# Patient Record
Sex: FEMALE | Race: OTHER | Employment: UNEMPLOYED | URBAN - METROPOLITAN AREA
[De-identification: names, ages, dates, MRNs, and addresses within clinical notes are randomized per-mention and may not be internally consistent; named-entity substitution may affect disease eponyms.]

---

## 2022-07-07 ENCOUNTER — OFFICE VISIT (OUTPATIENT)
Dept: PRIMARY CARE CLINIC | Age: 22
End: 2022-07-07
Payer: COMMERCIAL

## 2022-07-07 VITALS
HEIGHT: 62 IN | DIASTOLIC BLOOD PRESSURE: 71 MMHG | OXYGEN SATURATION: 99 % | RESPIRATION RATE: 16 BRPM | BODY MASS INDEX: 40.04 KG/M2 | TEMPERATURE: 97.1 F | HEART RATE: 83 BPM | SYSTOLIC BLOOD PRESSURE: 111 MMHG | WEIGHT: 217.6 LBS

## 2022-07-07 DIAGNOSIS — K13.0 LIP LESION: ICD-10-CM

## 2022-07-07 DIAGNOSIS — F33.0 MILD EPISODE OF RECURRENT MAJOR DEPRESSIVE DISORDER (HCC): Primary | ICD-10-CM

## 2022-07-07 PROCEDURE — 99203 OFFICE O/P NEW LOW 30 MIN: CPT | Performed by: FAMILY MEDICINE

## 2022-07-07 RX ORDER — BUPROPION HYDROCHLORIDE 100 MG/1
100 TABLET, EXTENDED RELEASE ORAL DAILY
Qty: 30 TABLET | Refills: 2 | Status: SHIPPED | OUTPATIENT
Start: 2022-07-07 | End: 2022-07-08 | Stop reason: SDUPTHER

## 2022-07-07 NOTE — PROGRESS NOTES
Chief Complaint   Patient presents with    New Patient    Physical     Visit Vitals  /71 (BP 1 Location: Left upper arm, BP Patient Position: Sitting, BP Cuff Size: Adult)   Pulse 83   Temp 97.1 °F (36.2 °C) (Temporal)   Resp 16   Ht 5' 2\" (1.575 m)   Wt 217 lb 9.6 oz (98.7 kg)   SpO2 99%   BMI 39.80 kg/m²     1. \"Have you been to the ER, urgent care clinic since your last visit? Hospitalized since your last visit? \" No    2. \"Have you seen or consulted any other health care providers outside of the [address] [place] [address] since your last visit? \" No     3. For patients aged 39-70: Has the patient had a colonoscopy / FIT/ Cologuard? NA - based on age      If the patient is female:    4. For patients aged 41-77: Has the patient had a mammogram within the past 2 years? NA - based on age or sex      11. For patients aged 21-65: Has the patient had a pap smear?  No

## 2022-07-07 NOTE — PROGRESS NOTES
HPI     Chief Complaint:   Chief Complaint   Patient presents with    New Patient    Physical       Bret Dixon is an 25 y.o. female. No current PCP. Medical history significant for: no prior medical history  There is no problem list on file for this patient. Concerns for today's visit:    Depression: hx of depression since adolescence. Was treated in high school but due to insurance changes she came off of medication (Celexa) for sometime. Sbe has a toddler, and noticed worsening depression symptoms, so she was restarted on Celexa 20mg last fall. She noticed some improvement, but not a drastic change. She also notes depression worsened after her IUD was placed, so she had it removed, but did not notice improvement in her mood. Currently:  Sleep: decreassed  Interest: decreased  Guilt: increased  Energy: decreased  Concentration: decreased  Appetite: increased  Psychomotor: none  Suicide: denies HI/SI    Lip discoloration: growing in size for several months, initially pinpoint, and now about the size of a small pea. No other lesions. Not painful. Allergies - reviewed:   No Known Allergies    Past Medical History - reviewed:  Past Medical History:   Diagnosis Date    Depression     PTSD (post-traumatic stress disorder)        Past Surgical History - reviewed:  History reviewed. No pertinent surgical history. Immunizations - reviewed:   Immunization History   Administered Date(s) Administered    COVID-19, PFIZER PURPLE top, DILUTE for use, (age 15 y+), IM, 30mcg/0.3mL 08/19/2021, 09/20/2021, 01/30/2022       Review of Systems   Review of Systems   Cardiovascular: Negative for chest pain and palpitations. Skin: Negative for itching and rash. Endo/Heme/Allergies: Negative for polydipsia. Does not bruise/bleed easily. Psychiatric/Behavioral: Positive for depression. Negative for substance abuse and suicidal ideas. The patient does not have insomnia.         Reviewed PmHx, FmHx, SocHx as well as meds and allergies, updated and dated in the chart. Objective     Visit Vitals  /71 (BP 1 Location: Left upper arm, BP Patient Position: Sitting, BP Cuff Size: Adult)   Pulse 83   Temp 97.1 °F (36.2 °C) (Temporal)   Resp 16   Ht 5' 2\" (1.575 m)   Wt 217 lb 9.6 oz (98.7 kg)   SpO2 99%   BMI 39.80 kg/m²       Physical Exam  Vitals and nursing note reviewed. Constitutional:       General: She is not in acute distress. Cardiovascular:      Rate and Rhythm: Normal rate and regular rhythm. Pulmonary:      Effort: Pulmonary effort is normal. No respiratory distress. Breath sounds: Normal breath sounds. Skin:     Coloration: Skin is not jaundiced. Comments: Non palpable pigmented lesion to lower left lip with regular borders. Neurological:      General: No focal deficit present. Mental Status: She is alert and oriented to person, place, and time. Assessment and Plan     Diagnoses and all orders for this visit:    1. Mild episode of recurrent major depressive disorder ([facility] [address]  Comments: Will switch to wellbutrin. Not currently on SSRI. Start daily with close follow up and notify me of any concerns. Orders:  -     buPROPion SR (WELLBUTRIN SR) 100 mg SR tablet; Take 1 Tablet by mouth daily. 2. Lip lesion  Comments:  Possibly benign but given concern and also growth referring to dermatology. Orders:  -     REFERRAL TO DERMATOLOGY         Follow-up and Dispositions    Return in about 2 months (around 9/7/2022) for annual physical and depression follow up. I discussed the aforementioned diagnoses with the patient as well as the plan of care. All questions were answered and an AVS was provided.        Suleman Butt MD  [facility]  [address], [place], [address] [place] [address]

## 2022-07-08 ENCOUNTER — TELEPHONE (OUTPATIENT)
Dept: PRIMARY CARE CLINIC | Age: 22
End: 2022-07-08

## 2022-07-08 DIAGNOSIS — F33.0 MILD EPISODE OF RECURRENT MAJOR DEPRESSIVE DISORDER (HCC): ICD-10-CM

## 2022-07-08 RX ORDER — BUPROPION HYDROCHLORIDE 100 MG/1
100 TABLET, EXTENDED RELEASE ORAL DAILY
Qty: 30 TABLET | Refills: 2 | Status: SHIPPED | OUTPATIENT
Start: 2022-07-08

## 2022-09-09 ENCOUNTER — OFFICE VISIT (OUTPATIENT)
Dept: PRIMARY CARE CLINIC | Age: 22
End: 2022-09-09
Payer: MEDICAID

## 2022-09-09 VITALS
TEMPERATURE: 97.3 F | HEART RATE: 76 BPM | OXYGEN SATURATION: 98 % | WEIGHT: 216.6 LBS | SYSTOLIC BLOOD PRESSURE: 116 MMHG | BODY MASS INDEX: 39.86 KG/M2 | RESPIRATION RATE: 20 BRPM | DIASTOLIC BLOOD PRESSURE: 78 MMHG | HEIGHT: 62 IN

## 2022-09-09 DIAGNOSIS — F33.0 MILD EPISODE OF RECURRENT MAJOR DEPRESSIVE DISORDER (HCC): ICD-10-CM

## 2022-09-09 DIAGNOSIS — Z01.419 WELL WOMAN EXAM WITH ROUTINE GYNECOLOGICAL EXAM: Primary | ICD-10-CM

## 2022-09-09 DIAGNOSIS — M65.4 TENDINITIS, DE QUERVAIN'S: ICD-10-CM

## 2022-09-09 DIAGNOSIS — Z11.59 NEED FOR HEPATITIS C SCREENING TEST: ICD-10-CM

## 2022-09-09 DIAGNOSIS — E66.9 OBESITY (BMI 30-39.9): ICD-10-CM

## 2022-09-09 DIAGNOSIS — Z13.6 ENCOUNTER FOR SCREENING FOR CARDIOVASCULAR DISORDERS: ICD-10-CM

## 2022-09-09 DIAGNOSIS — Z11.3 ROUTINE SCREENING FOR STI (SEXUALLY TRANSMITTED INFECTION): ICD-10-CM

## 2022-09-09 DIAGNOSIS — G56.03 CARPAL TUNNEL SYNDROME, BILATERAL: ICD-10-CM

## 2022-09-09 PROCEDURE — 99395 PREV VISIT EST AGE 18-39: CPT | Performed by: FAMILY MEDICINE

## 2022-09-09 NOTE — PROGRESS NOTES
HPI     Chief Complaint   Patient presents with    Physical        HPI:  Solomon Stover is a 25 y.o. female who has a history of Depression    Patient desires a physical.    Specialists: none  Occupation: works at [employer]  Dentist: overdue    Depression: was not controlled well on celexa so we switched to wellbutrin daily which she has noticed helps with her depression and she is pleased with results thus far. She denies HI/SI. Carpal tunnel: has been present for years, severe enough she was planned to have tunnel release surgery, but did not follow through with it due to the pandemic. Recently is noting increasing pain in wrists with finger tingling, bilaterally. Often her thumbs feel weak. Worse in left hand. Patient is left hand dominant. Sleep: \"definitely a lot better\". Interest: improved  Guilt: none  Energy: improved  Concentration:   Appetite: unchanged  Psychomotor: none  Suicide: denies HI/SI    Previously:  Sleep: decreassed  Interest: decreased  Guilt: increased  Energy: decreased  Concentration: decreased  Appetite: increased  Psychomotor: none  Suicide: denies HI/SI     Gyn Hx  Sexually active with fiance. Monogamous, does not wear condoms. OB History          1    Para        Term                AB        Living   1         SAB        IAB        Ectopic        Molar        Multiple        Live Births                      No Known Allergies    Current Outpatient Medications   Medication Sig    buPROPion SR (WELLBUTRIN SR) 100 mg SR tablet Take 1 Tablet by mouth daily. No current facility-administered medications for this visit. Review of Systems   Constitutional:  Negative for chills and fever. Eyes:  Negative for discharge and redness. Respiratory:  Negative for cough and shortness of breath. Cardiovascular:  Negative for chest pain and palpitations. Gastrointestinal:  Negative for abdominal pain and blood in stool.    Musculoskeletal: Negative for falls and myalgias. Skin:  Negative for itching and rash. Neurological:  Negative for focal weakness and headaches. Endo/Heme/Allergies:  Negative for polydipsia. Does not bruise/bleed easily. Psychiatric/Behavioral:  Negative for hallucinations and substance abuse. Reviewed PmHx, FmHx, SocHx as well as meds and allergies, updated and dated in the chart. Objective     Visit Vitals  /78 (BP 1 Location: Left upper arm, BP Patient Position: Sitting, BP Cuff Size: Large adult)   Pulse 76   Temp 97.3 °F (36.3 °C) (Temporal)   Resp 20   Ht 5' 2\" (1.575 m)   Wt 216 lb 9.6 oz (98.2 kg)   SpO2 98%   BMI 39.62 kg/m²     Physical Exam  Vitals and nursing note reviewed. Constitutional:       General: She is not in acute distress. HENT:      Head: Normocephalic and atraumatic. Right Ear: Tympanic membrane and external ear normal.      Left Ear: Tympanic membrane and external ear normal.      Nose: No congestion or rhinorrhea. Mouth/Throat:      Mouth: Mucous membranes are moist.      Pharynx: No oropharyngeal exudate. Eyes:      Extraocular Movements: Extraocular movements intact. Conjunctiva/sclera: Conjunctivae normal.      Pupils: Pupils are equal, round, and reactive to light. Cardiovascular:      Rate and Rhythm: Normal rate and regular rhythm. Heart sounds: No murmur heard. Pulmonary:      Effort: Pulmonary effort is normal. No respiratory distress. Breath sounds: No wheezing. Abdominal:      General: Abdomen is flat. Palpations: Abdomen is soft. Tenderness: no abdominal tenderness   Skin:     General: Skin is warm. Coloration: Skin is not jaundiced. Neurological:      General: No focal deficit present. Mental Status: She is alert and oriented to person, place, and time.    Psychiatric:         Mood and Affect: Mood normal.         Behavior: Behavior normal.      Exam chaperoned by Maria Elena Cano LPN  Breast -breasts appear normal, no suspicious masses, no skin or nipple changes or axillary nodes . Pelvic - External genitalia normal without rashes or lesions. Pink and moist vaginal mucosa. Scant white discharge. Cervix without lesions or abnormal discharge. Uterus non tender and normal size. Adnexa non palpable. Assessment and Plan     Was previously in [place]--vaccines probably received there. Diagnoses and all orders for this visit:    1. Well woman exam with routine gynecological exam  Comments:  Age appropriate guidance given.  updated. Orders:  -     CBC W/O DIFF  -     METABOLIC PANEL, COMPREHENSIVE  -     LIPID PANEL  -     TSH RFX ON ABNORMAL TO FREE T4  -     HEPATITIS C AB  -     OBTAINING SCREEN PAP SMEAR  -     PAP IG, RFX APTIMA HPV ASCUS ([id number])    2. Mild episode of recurrent major depressive disorder ([facility] [address]  Comments:  Improving. Continue wellbutrin 100mg daily. 3. Obesity (BMI 30-39. 9)  Comments:  Encouraging eating three well balanced meals a day and setting aside time for exercise. Orders:  -     CBC W/O DIFF  -     TSH RFX ON ABNORMAL TO FREE T4    4. Carpal tunnel syndrome, bilateral  Comments:  Previously not responsive to supportive care and OTC interventions. Referring to ortho as she previously required steroid injections. Orders:  -     REFERRAL TO ORTHOPEDICS    5. Tendinitis, de Karis's  Comments:  See above. Orders:  -     REFERRAL TO ORTHOPEDICS    6. Routine screening for STI (sexually transmitted infection)  -     NUSWAB VAGINITIS  -     T PALLIDUM SCREEN W/REFLEX  -     HIV 1/2 AG/AB, 4TH GENERATION,W RFLX CONFIRM    7. Need for hepatitis C screening test  -     HEPATITIS C AB    8. Encounter for screening for cardiovascular disorders  -     CBC W/O DIFF  -     METABOLIC PANEL, COMPREHENSIVE  -     LIPID PANEL  -     HEPATITIS C AB         As applicable:  Medication Side Effects and Warnings were discussed with patient. Patient Labs were reviewed and or requested.   Patient Past Records were reviewed and or requested. Follow-up and Dispositions    Return in about 6 months (around 3/9/2023) for Depression f/u. I have discussed the diagnosis with the patient and the intended plan as seen in the above orders. The patient has received an after-visit summary and questions were answered concerning future plans. I have discussed medication side effects and warnings with the patient as well.       Yoli Stockton MD  [address] [place] [address]

## 2022-09-09 NOTE — PROGRESS NOTES
1. \"Have you been to the ER, urgent care clinic since your last visit? Hospitalized since your last visit? \" No    2. \"Have you seen or consulted any other health care providers outside of the [address] [place] [address] since your last visit? \" No     3. For patients aged 39-70: Has the patient had a colonoscopy / FIT/ Cologuard? NA - based on age      If the patient is female:    4. For patients aged 41-77: Has the patient had a mammogram within the past 2 years? NA - based on age or sex      11. For patients aged 21-65: Has the patient had a pap smear? No  Visit Vitals  /78 (BP 1 Location: Left upper arm, BP Patient Position: Sitting, BP Cuff Size: Large adult)   Pulse 76   Temp 97.3 °F (36.3 °C) (Temporal)   Resp 20   Ht 5' 2\" (1.575 m)   Wt 216 lb 9.6 oz (98.2 kg)   SpO2 98%   BMI 39.62 kg/m²     Chief Complaint   Patient presents with    Establish Care     L>R wrist/hand discomfort.

## 2022-09-10 LAB
ALBUMIN SERPL-MCNC: 4.2 G/DL (ref 3.9–5)
ALBUMIN/GLOB SERPL: 1.6 {RATIO} (ref 1.2–2.2)
ALP SERPL-CCNC: 94 IU/L (ref 44–121)
ALT SERPL-CCNC: 38 IU/L (ref 0–32)
AST SERPL-CCNC: 24 IU/L (ref 0–40)
BILIRUB SERPL-MCNC: 0.3 MG/DL (ref 0–1.2)
BUN SERPL-MCNC: 7 MG/DL (ref 6–20)
BUN/CREAT SERPL: 15 (ref 9–23)
CALCIUM SERPL-MCNC: 9.1 MG/DL (ref 8.7–10.2)
CHLORIDE SERPL-SCNC: 101 MMOL/L (ref 96–106)
CHOLEST SERPL-MCNC: 156 MG/DL (ref 100–199)
CO2 SERPL-SCNC: 23 MMOL/L (ref 20–29)
CREAT SERPL-MCNC: 0.47 MG/DL (ref 0.57–1)
EGFR: 139 ML/MIN/1.73
ERYTHROCYTE [DISTWIDTH] IN BLOOD BY AUTOMATED COUNT: 13 % (ref 11.7–15.4)
GLOBULIN SER CALC-MCNC: 2.6 G/DL (ref 1.5–4.5)
GLUCOSE SERPL-MCNC: 88 MG/DL (ref 65–99)
HCT VFR BLD AUTO: 38.3 % (ref 34–46.6)
HCV AB S/CO SERPL IA: <0.1 S/CO RATIO (ref 0–0.9)
HDLC SERPL-MCNC: 45 MG/DL
HGB BLD-MCNC: 13.1 G/DL (ref 11.1–15.9)
LDLC SERPL CALC-MCNC: 91 MG/DL (ref 0–99)
MCH RBC QN AUTO: 29.8 PG (ref 26.6–33)
MCHC RBC AUTO-ENTMCNC: 34.2 G/DL (ref 31.5–35.7)
MCV RBC AUTO: 87 FL (ref 79–97)
PLATELET # BLD AUTO: 266 X10E3/UL (ref 150–450)
POTASSIUM SERPL-SCNC: 4.3 MMOL/L (ref 3.5–5.2)
PROT SERPL-MCNC: 6.8 G/DL (ref 6–8.5)
RBC # BLD AUTO: 4.39 X10E6/UL (ref 3.77–5.28)
SODIUM SERPL-SCNC: 138 MMOL/L (ref 134–144)
TRIGL SERPL-MCNC: 112 MG/DL (ref 0–149)
TSH SERPL DL<=0.005 MIU/L-ACNC: 0.83 UIU/ML (ref 0.45–4.5)
VLDLC SERPL CALC-MCNC: 20 MG/DL (ref 5–40)
WBC # BLD AUTO: 9.1 X10E3/UL (ref 3.4–10.8)

## 2022-09-11 LAB
HIV 1+2 AB+HIV1 P24 AG SERPL QL IA: NON REACTIVE
TREPONEMA PALLIDUM IGG+IGM AB [PRESENCE] IN SERUM OR PLASMA BY IMMUNOASSAY: NON REACTIVE

## 2022-09-12 LAB — SPECIMEN STATUS REPORT, ROLRST: NORMAL

## 2022-09-12 NOTE — PROGRESS NOTES
[employer] message sent:    Mason Duarte,    Your labs have returned. Your blood counts are normal.  Your cholesterol levels are within acceptable limits. Your thyroid function is normal.    Your HIV and syphilis were negative. Your hepatitis C screening was negative. Your kidney function and electrolytes are normal.    One of your liver enzymes is elevated but the others remain within normal limits. This is something we will monitor on future labs. Your pap smear and remaining STD labs are still pending and likely will be back later this week. I will contact you once they return. Thank you for the privilege to participate in your healthcare and please reach out should you have any further questions.

## 2022-09-13 LAB
CYTOLOGIST CVX/VAG CYTO: NORMAL
CYTOLOGY CVX/VAG DOC CYTO: NORMAL
CYTOLOGY CVX/VAG DOC THIN PREP: NORMAL
LABCORP, 190119: NORMAL
Lab: NORMAL
OTHER STN SPEC: NORMAL
STAT OF ADQ CVX/VAG CYTO-IMP: NORMAL

## 2022-09-13 NOTE — PROGRESS NOTES
[employer] message sent:    Mason Duarte,    Your pap smear was negative for abnormal cells. This is good news. We will repeat this in 3 years. Thank you for the privilege to participate in your healthcare and please reach out should you have any further questions.      Dr. Myles Vitale

## 2023-03-10 ENCOUNTER — OFFICE VISIT (OUTPATIENT)
Dept: PRIMARY CARE CLINIC | Age: 23
End: 2023-03-10
Payer: MEDICAID

## 2023-03-10 VITALS
WEIGHT: 218.6 LBS | TEMPERATURE: 97.4 F | RESPIRATION RATE: 18 BRPM | BODY MASS INDEX: 40.23 KG/M2 | HEIGHT: 62 IN | OXYGEN SATURATION: 98 % | DIASTOLIC BLOOD PRESSURE: 72 MMHG | HEART RATE: 76 BPM | SYSTOLIC BLOOD PRESSURE: 110 MMHG

## 2023-03-10 DIAGNOSIS — F33.0 MILD EPISODE OF RECURRENT MAJOR DEPRESSIVE DISORDER (HCC): ICD-10-CM

## 2023-03-10 PROCEDURE — 99214 OFFICE O/P EST MOD 30 MIN: CPT | Performed by: FAMILY MEDICINE

## 2023-03-10 RX ORDER — BUPROPION HYDROCHLORIDE 100 MG/1
100 TABLET, EXTENDED RELEASE ORAL 2 TIMES DAILY
Qty: 60 TABLET | Refills: 0 | Status: SHIPPED | OUTPATIENT
Start: 2023-03-10 | End: 2023-03-10 | Stop reason: ALTCHOICE

## 2023-03-10 RX ORDER — BUPROPION HYDROCHLORIDE 300 MG/1
300 TABLET ORAL DAILY
Qty: 30 TABLET | Refills: 0 | Status: SHIPPED | OUTPATIENT
Start: 2023-03-10

## 2023-03-10 NOTE — LETTER
NOTIFICATION RETURN TO WORK / SCHOOL    3/10/2023 11:16 AM    Ms. Hawa Coffey [facility] [address] [id number]      To Whom It May Concern:    Sven Leung is currently under the care of Rosalba Joyner. Patient was seen today in office March 10, 2023. If there are questions or concerns please have the patient contact our office.         Sincerely,      Myles Mckay MD

## 2023-03-10 NOTE — PROGRESS NOTES
HPI     Chief Complaint   Patient presents with    Depression        HPI:  Domenico Munoz is a 25 y.o. female who has a history of PTSD. Depression: last visit patient was switched to wellbutrin 100mg daily. She was previously on celexa but did not notice any improvement in her depression. Currently she notices improvement from when we first began medication but still has low motivation, concentration issues, anhedonia, and depressive symptoms overall. 3 most recent PHQ Screens 3/10/2023   Little interest or pleasure in doing things More than half the days   Feeling down, depressed, irritable, or hopeless More than half the days   Total Score PHQ 2 4   Trouble falling or staying asleep, or sleeping too much More than half the days   Feeling tired or having little energy More than half the days   Poor appetite, weight loss, or overeating More than half the days   Feeling bad about yourself - or that you are a failure or have let yourself or your family down Several days   Trouble concentrating on things such as school, work, reading, or watching TV More than half the days   Moving or speaking so slowly that other people could have noticed; or the opposite being so fidgety that others notice Several days   Thoughts of being better off dead, or hurting yourself in some way Not at all   PHQ 9 Score 14   How difficult have these problems made it for you to do your work, take care of your home and get along with others Somewhat difficult     No Known Allergies    Current Outpatient Medications   Medication Sig    buPROPion XL (WELLBUTRIN XL) 300 mg XL tablet Take 1 Tablet by mouth daily. No current facility-administered medications for this visit. Review of Systems   Respiratory:  Negative for cough and shortness of breath. Cardiovascular:  Negative for chest pain and palpitations. Psychiatric/Behavioral:  Positive for depression. Negative for substance abuse and suicidal ideas.  The patient has insomnia. Reviewed PmHx, FmHx, SocHx as well as meds and allergies, updated and dated in the chart. Objective     Visit Vitals  /72 (BP 1 Location: Left upper arm, BP Patient Position: Sitting, BP Cuff Size: Large adult)   Pulse 76   Temp 97.4 °F (36.3 °C) (Temporal)   Resp 18   Ht 5' 2\" (1.575 m)   Wt 218 lb 9.6 oz (99.2 kg)   SpO2 98%   BMI 39.98 kg/m²     Physical Exam  Vitals and nursing note reviewed. Constitutional:       Appearance: Normal appearance. Cardiovascular:      Rate and Rhythm: Normal rate and regular rhythm. Heart sounds: Normal heart sounds. Pulmonary:      Effort: Pulmonary effort is normal. No respiratory distress. Breath sounds: Normal breath sounds. Neurological:      General: No focal deficit present. Mental Status: She is alert and oriented to person, place, and time. Psychiatric:         Thought Content: Thought content normal.         Judgment: Judgment normal.      Comments: Good eye contact. Flat affect. Denies HI/SI. Assessment and Plan     A total of 30 minutes was spent on the date of service reviewing previous records, counseling the patient on plan of care, discussing medication options, counseling with patient, and documentation. Diagnoses and all orders for this visit:    1. Mild episode of recurrent major depressive disorder ([facility] [address]  Comments:  Uncontrolled. Will increase wellbutrin to 300mg but switch to XL as patient concerned she may forget additional dose on SR. Orders:  -     buPROPion XL (WELLBUTRIN XL) 300 mg XL tablet; Take 1 Tablet by mouth daily. As applicable:  Medication Side Effects and Warnings were discussed with patient. Patient Labs were reviewed and or requested. Patient Past Records were reviewed and or requested. Follow-up and Dispositions    Return in about 1 month (around 4/10/2023).           I have discussed the diagnosis with the patient and the intended plan as seen in the above orders. The patient has received an after-visit summary and questions were answered concerning future plans. I have discussed medication side effects and warnings with the patient as well.       Jim Gee MD  [address] [place] [address]

## 2023-03-10 NOTE — PROGRESS NOTES
Identified Patient with two Patient identifiers(name and ). 1. \"Have you been to the ER, urgent care clinic since your last visit? Hospitalized since your last visit? \" No    2. \"Have you seen or consulted any other health care providers outside of the [address] [place] [address] since your last visit? \" No     3. For patients aged 39-70: Has the patient had a colonoscopy / FIT/ Cologuard? NA - based on age      If the patient is female:    4. For patients aged 41-77: Has the patient had a mammogram within the past 2 years? No      5. For patients aged 21-65: Has the patient had a pap smear?  No     Visit Vitals  /72 (BP 1 Location: Left upper arm, BP Patient Position: Sitting, BP Cuff Size: Large adult)   Pulse 76   Temp 97.4 °F (36.3 °C) (Temporal)   Resp 18   Ht 5' 2\" (1.575 m)   Wt 218 lb 9.6 oz (99.2 kg)   SpO2 98%   BMI 39.98 kg/m²       Chief Complaint   Patient presents with    Depression       Health Maintenance Due   Topic Date Due    COVID-19 Vaccine (5 - Booster for [employer] series) 2022    Flu Vaccine (1) 2022

## 2023-03-27 ENCOUNTER — HOSPITAL ENCOUNTER (EMERGENCY)
Age: 23
Discharge: HOME | End: 2023-03-27
Attending: STUDENT IN AN ORGANIZED HEALTH CARE EDUCATION/TRAINING PROGRAM
Payer: MEDICAID

## 2023-03-27 ENCOUNTER — APPOINTMENT (OUTPATIENT)
Dept: GENERAL RADIOLOGY | Age: 23
End: 2023-03-27
Attending: EMERGENCY MEDICINE
Payer: MEDICAID

## 2023-03-27 VITALS
TEMPERATURE: 97.9 F | OXYGEN SATURATION: 97 % | SYSTOLIC BLOOD PRESSURE: 135 MMHG | HEART RATE: 107 BPM | RESPIRATION RATE: 16 BRPM | DIASTOLIC BLOOD PRESSURE: 81 MMHG

## 2023-03-27 DIAGNOSIS — M25.572 ACUTE LEFT ANKLE PAIN: Primary | ICD-10-CM

## 2023-03-27 PROCEDURE — 73610 X-RAY EXAM OF ANKLE: CPT

## 2023-03-27 PROCEDURE — 99283 EMERGENCY DEPT VISIT LOW MDM: CPT

## 2023-03-27 PROCEDURE — 73630 X-RAY EXAM OF FOOT: CPT

## 2023-03-27 RX ORDER — IBUPROFEN 600 MG/1
600 TABLET ORAL
Qty: 20 TABLET | Refills: 0 | Status: SHIPPED | OUTPATIENT
Start: 2023-03-27

## 2023-03-27 NOTE — ED TRIAGE NOTES
Pt stated she fell down some steps on Thursday, more than 10, had some swelling to her ankle, swelling is gone but pt continues to have pain ambulatory with slight limp

## 2023-03-27 NOTE — ED PROVIDER NOTES
25year old female who presents to the emergency room with complaints of left ankle pain. Stated she fell down some steps on Thursday, more than 10, had some swelling to her ankle. Swelling is gone but pt continues to have pain ambulatory with slight limp. Did not hit her head, no LOC. There are no further complaints at this time. Past Medical History:   Diagnosis Date    Depression     PTSD (post-traumatic stress disorder)        No past surgical history on file. Family History:   Problem Relation Age of Onset    Depression Mother     Diabetes Maternal Aunt        Social History     Socioeconomic History    Marital status: SINGLE     Spouse name: Not on file    Number of children: Not on file    Years of education: Not on file    Highest education level: Not on file   Occupational History    Not on file   Tobacco Use    Smoking status: Never    Smokeless tobacco: Never   Vaping Use    Vaping Use: Never used   Substance and Sexual Activity    Alcohol use: Yes     Comment: rarely    Drug use: Never    Sexual activity: Yes     Partners: Male   Other Topics Concern    Not on file   Social History Narrative    Not on file     Social Determinants of Health     Financial Resource Strain: Low Risk     Difficulty of Paying Living Expenses: Not very hard   Food Insecurity: No Food Insecurity    Worried About Running Out of Food in the Last Year: Never true    Ran Out of Food in the Last Year: Never true   Transportation Needs: Not on file   Physical Activity: Not on file   Stress: Not on file   Social Connections: Not on file   Intimate Partner Violence: Not on file   Housing Stability: Not on file         ALLERGIES: Patient has no known allergies. Review of Systems   Constitutional:  Negative for appetite change, chills, diaphoresis, fatigue and fever. HENT:  Negative for congestion, ear discharge, ear pain, sinus pressure, sinus pain, sore throat and trouble swallowing.     Eyes:  Negative for photophobia, pain, redness and visual disturbance. Respiratory:  Negative for chest tightness, shortness of breath and wheezing. Cardiovascular:  Negative for chest pain and palpitations. Gastrointestinal:  Negative for abdominal distention, abdominal pain, nausea and vomiting. Endocrine: Negative. Genitourinary:  Negative for difficulty urinating, flank pain, frequency and urgency. Musculoskeletal:  Positive for arthralgias (left ankle). Negative for back pain, neck pain and neck stiffness. Skin:  Negative for color change, pallor, rash and wound. Allergic/Immunologic: Negative. Neurological:  Negative for dizziness, speech difficulty, weakness and headaches. Hematological:  Does not bruise/bleed easily. Psychiatric/Behavioral:  Negative for behavioral problems. The patient is not nervous/anxious. Vitals:    03/27/23 1337   BP: 135/81   Pulse: (!) 107   Resp: 16   Temp: 97.9 °F (36.6 °C)   SpO2: 97%            Physical Exam  Vitals and nursing note reviewed. Constitutional:       General: She is not in acute distress. Appearance: Normal appearance. She is well-developed. She is not ill-appearing. HENT:      Head: Normocephalic and atraumatic. Right Ear: External ear normal.      Left Ear: External ear normal.      Nose: Nose normal. No congestion. Mouth/Throat:      Mouth: Mucous membranes are moist.   Eyes:      General:         Right eye: No discharge. Left eye: No discharge. Conjunctiva/sclera: Conjunctivae normal.      Pupils: Pupils are equal, round, and reactive to light. Neck:      Vascular: No JVD. Trachea: No tracheal deviation. Cardiovascular:      Rate and Rhythm: Normal rate and regular rhythm. Pulses: Normal pulses. Heart sounds: Normal heart sounds. No murmur heard. No gallop. Pulmonary:      Effort: Pulmonary effort is normal. No respiratory distress. Breath sounds: Normal breath sounds.    Chest:      Chest wall: No tenderness. Abdominal:      General: Bowel sounds are normal. There is no distension. Palpations: Abdomen is soft. Tenderness: There is no abdominal tenderness. There is no guarding or rebound. Genitourinary:     Comments: Negative    Musculoskeletal:         General: Tenderness (left ankle) present. Normal range of motion. Cervical back: Normal range of motion and neck supple. Skin:     General: Skin is warm and dry. Capillary Refill: Capillary refill takes less than 2 seconds. Coloration: Skin is not pale. Findings: No erythema or rash. Neurological:      General: No focal deficit present. Mental Status: She is alert and oriented to person, place, and time. Motor: No weakness. Coordination: Coordination normal.   Psychiatric:         Mood and Affect: Mood normal.         Behavior: Behavior normal.         Thought Content: Thought content normal.         Judgment: Judgment normal.        Medical Decision Making  Pt stated she fell down some steps on Thursday, more than 10, had some swelling to her ankle, swelling is gone but pt continues to have pain ambulatory with slight limp. C/o left ankle pain that has persisted. Positive palpable pulses, neurologically intact. Differential diagnosis includes ankle fracture, ankle sprain, musculoskeletal pain and others. After physical assessment and review of imaging, it was determined there is no acute fracture. Patient with palpable pulses, neurologically intact. Rest, ice, elevate, compression with ace wrap. patient was discharged home and will follow up with PCP. Follow up with orthopedics as an outpatient. Return to the emergency room with worsening symptoms. Patient in agreement with plan of care. Any available vitals, labs, images, nursing notes, medications, allergies, PMH, PSH and/or previous records in the chart were reviewed. All of these were considered in the medical decision making process.  I individually reviewed any labs and any images obtained. This was discussed with my attending and he/she is in agreement with plan of care. Problems Addressed:  Acute left ankle pain: acute illness or injury    Amount and/or Complexity of Data Reviewed  Radiology: ordered and independent interpretation performed. Decision-making details documented in ED Course. Risk  Prescription drug management. Labs Reviewed - No data to display    XR ANKLE LT MIN 3 V    Result Date: 3/27/2023  No fracture. XR FOOT LT MIN 3 V    Result Date: 3/27/2023  No fracture. 3:40 PM  Pt has been reexamined. Pt has no new complaints, changes or physical findings. Care plan outlined and precautions discussed. All available results were reviewed with pt. All medications were reviewed with pt. All of pt's questions and concerns were addressed. Pt agrees to F/U as instructed and agrees to return to ED upon further deterioration. Pt is ready to go home. Rudy Arteaga NP    Please note that this dictation was completed with [employer], the computer voice recognition software. Quite often unanticipated grammatical, syntax, homophones, and other interpretive errors are inadvertently transcribed by the computer software. Please disregard these errors. Please excuse any errors that have escaped final proofreading. Thank you.     Procedures

## 2023-03-27 NOTE — Clinical Note
[address]  [address] [facility] [id number]  [phone]    Work/School Note    Date: 3/27/2023    To Whom It May concern:    Florentin Barber was seen and treated today in the emergency room by the following provider(s):  Attending Provider: Janine Fernando DO  Nurse Practitioner: Judy Hurd NP. Florentin Barber is excused from work/school on 3/27/2023 through 3/29/2023. She is medically clear to return to work/school on 3/30/2023.          Sincerely,          Valencia Velasco NP